# Patient Record
Sex: MALE | Race: WHITE
[De-identification: names, ages, dates, MRNs, and addresses within clinical notes are randomized per-mention and may not be internally consistent; named-entity substitution may affect disease eponyms.]

---

## 2017-12-13 ENCOUNTER — HOSPITAL ENCOUNTER (EMERGENCY)
Dept: HOSPITAL 25 - ED | Age: 32
Discharge: HOME | End: 2017-12-13
Payer: COMMERCIAL

## 2017-12-13 VITALS — SYSTOLIC BLOOD PRESSURE: 124 MMHG | DIASTOLIC BLOOD PRESSURE: 71 MMHG
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
## 2017-12-13 DIAGNOSIS — S16.1XXA: Primary | ICD-10-CM
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
## 2017-12-13 DIAGNOSIS — M54.2: ICD-10-CM

## 2017-12-13 DIAGNOSIS — G89.21: ICD-10-CM

## 2017-12-13 PROCEDURE — 72125 CT NECK SPINE W/O DYE: CPT

## 2017-12-13 PROCEDURE — 99282 EMERGENCY DEPT VISIT SF MDM: CPT

## 2017-12-13 NOTE — RAD
HISTORY: Neck pain, injury



COMPARISONS: None



TECHNIQUE: Multiple contiguous axial CT scans were obtained of the cervical spine without

intravenous contrast, with coronal and sagittal multiplanar reformations.    



FINDINGS:



BRAIN: The visualized brain is unremarkable

CENTRAL CANAL: Evaluation of the central canal is limited on CT technique, however there

is no obvious canalicular mass or epidural hemorrhage.



ALIGNMENT: There is straightening of the normal cervical lordosis.

VERTEBRAL BODIES: The odontoid process is intact. The atlantoaxial intervals are

symmetric. The vertebral bodies are normal in attenuation, without fracture.

JOINTS: There is no subluxation or dislocation

MUSCULATURE: Unremarkable

INTERVERTEBRAL DISCS: The intervertebral disc spaces are relatively preserved in height.



AXIAL IMAGES: On axial images, there is no osseous neural foraminal narrowing or central

canal stenosis.



SOFT TISSUES: The visualized soft tissues of the neck are unremarkable. The prevertebral

fat stripe is preserved.



OTHER: None.



IMPRESSION: 

NO ACUTE OSSEOUS INJURY TO THE CERVICAL SPINE
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
## 2018-02-12 ENCOUNTER — HOSPITAL ENCOUNTER (EMERGENCY)
Dept: HOSPITAL 25 - ED | Age: 33
Discharge: HOME | End: 2018-02-12
Payer: COMMERCIAL

## 2018-02-12 VITALS — SYSTOLIC BLOOD PRESSURE: 131 MMHG | DIASTOLIC BLOOD PRESSURE: 95 MMHG

## 2018-02-12 DIAGNOSIS — N50.812: Primary | ICD-10-CM

## 2018-02-12 PROCEDURE — 81003 URINALYSIS AUTO W/O SCOPE: CPT

## 2018-02-12 PROCEDURE — 76870 US EXAM SCROTUM: CPT

## 2018-02-12 PROCEDURE — 99282 EMERGENCY DEPT VISIT SF MDM: CPT

## 2018-02-12 NOTE — RAD
INDICATION: Left scrotal lump



COMPARISON: None

 

TECHNIQUE: Duplex interrogation of the scrotum was performed.



FINDINGS: The testicles are normal in size and echogenicity. There is no evidence for

testicular mass. The right testis measures 4.5 x 2.8 x 3.0 cm and the left 4.6 x 2.7 x 3.1

cm.



There is symmetric flow on Doppler interrogation. Arterial and venous waveforms are

identified bilaterally.



The right epididymal head measures 1.1 x 0.9  cm and the left 0.9 x 0.5 cm.



Adjacent to the left epididymal head is a hypoechoic and apparently avascular structure

measuring 1.3 x 0.7 x 0.4 cm.



IMPRESSION:  Adjacent to the left epididymal head is an avascular and hypoechoic structure

of indeterminate clinical significance. This could represent a partially thrombosed

varicocele.

## 2018-02-12 NOTE — ED
Al ROSENTHAL Jennifer, scribed for Jalen Mcmillan MD on 02/12/18 at 1957 .





GI/ HPI





- HPI Summary


HPI Summary: 





The patient is a 32 year old male who presents with left testicular pain that 

began about three-four days ago. 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 The next 

day, the pain became a soreness that radiated into his groin. He additionally 

complains of numbness and tingling in his left foot and leg. He took Ibuprofen 

for the pain.





- History of Current Complaint


Chief Complaint: EDUrogenitalProblems


Time Seen by Provider: 02/12/18 19:33


Stated Complaint: PAIN IN LT TESTICLE


Hx Obtained From: Patient


Onset/Duration: Started Days Ago - three-four days, Still Present


Timing: Constant


Severity: Moderate


Current Severity: Moderate


Pain Intensity: 5


Location of Pain: Groin


Additional Locations for Males: Testicles


Pain Characteristics: Other: - Soreness


Associated Signs and Symptoms: Positive: Other: - Numbness and tingling in left 

foot and leg


Aggravating Factor(s): Nothing


Alleviating Factor(s): Nothing





PMH/Surg Hx/FS Hx/Imm Hx


Endocrine/Hematology History: 


   Denies: Hx Diabetes


Cardiovascular History: 


   Denies: Hx Hypertension


Infectious Disease History: No


Infectious Disease History: 


   Denies: Traveled Outside the [place] in Last 30 Days





- Family History


Known Family History: Positive: Hypertension


   Negative: Diabetes





- Social History


Smoking Status (MU): Never Smoked Tobacco





Review of Systems


Positive: other - Left testicular pain


Positive: Numbness - Numbness and tingling of left foot and leg


All Other Systems Reviewed And Are Negative: Yes





Physical Exam





- Summary


Physical Exam Summary: 





Appearance: The patient is well-nourished in no acute distress and in no acute 

pain.


 


Skin: The skin is warm and dry and skin color reflects adequate perfusion.


 


HEENT: ~The head is normocephalic and atraumatic. The pupils are equal and 

reactive. The conjunctivae are clear and without drainage. ~Nares are patent 

and without drainage. ~Mouth reveals moist mucous membranes and the throat is 

without erythema and exudate. ~The external ears are intact. The ear canals are 

patent and without drainage. The tympanic membranes are intact.


 


Neck: the neck is supple with full range of motion and non-tender. There are no 

carotid bruits. ~There is no neck vein distension.


 


Respiratory: Chest is non-tender. ~Lungs are clear to auscultation and breath 

sounds are symmetrical and equal.


 


Cardiovascular: Heart is regular rate and rhythm. ~There is no murmur or rub 

auscultated. ~~There is no peripheral edema and pulses are symmetrical and 

equal.


 


Abdomen: The abdomen is soft and non-tender. ~There are normal bowel sounds 

heard in all four quadrants and there is no organomegaly palpated.





Genitourinary: Tenderness over epididymis.


 


Musculoskeletal: There is no back tenderness noted. ~Extremities are non-tender 

with full range of motion. ~There is good capillary refill. ~There is no 

peripheral edema or calf tenderness elicited.


 


Neurological: Patient is alert and oriented to person, place and time. ~The 

patient has symmetrical motor strength in all four extremities. ~Cranial nerves 

are grossly intact. Deep tendon reflexes are symmetrical and equal in all four 

extremities.


 


Psychiatric: The patient has an appropriate affect and does not exhibit any 

anxiety or depression.


Triage Information Reviewed: Yes


Vital Signs On Initial Exam: 


 Initial Vitals











Temp Pulse Resp BP Pulse Ox


 


 99.1 F   101   16   138/72   99 


 


 02/12/18 16:32  02/12/18 16:32  02/12/18 16:32  02/12/18 16:32  02/12/18 16:32











Vital Signs Reviewed: Yes





Diagnostics





- Vital Signs


 Vital Signs











  Temp Pulse Resp BP Pulse Ox


 


 02/12/18 18:28  100.4 F  102  16  145/70  100


 


 02/12/18 16:32  99.1 F  101  16  138/72  99














- Laboratory


Lab Results: 


 Lab Results











  02/12/18 Range/Units





  17:45 


 


Urine Color  Straw  


 


Urine Appearance  Clear  


 


Urine pH  7.0  (5-9)  


 


Ur Specific Gravity  1.004 L  (1.010-1.030)  


 


Urine Protein  Negative  (Negative)  


 


Urine Ketones  Negative  (Negative)  


 


Urine Blood  Negative  (Negative)  


 


Urine Nitrate  Negative  (Negative)  


 


Urine Bilirubin  Negative  (Negative)  


 


Urine Urobilinogen  Negative  (Negative)  


 


Ur Leukocyte Esterase  Negative  (Negative)  


 


Urine Glucose  Negative  (Negative)  











Lab Statement: Any lab studies that have been ordered have been reviewed, and 

results considered in the medical decision making process.





- Additional Comments


Diagnostic Additional Comments: 





Testicular Ultrasound. Interpreted by a radiologist.


Adjacent to the left epididymal head is an avascular and hypoechoic structure


of indeterminate clinical significance. This could represent a partially 

thrombosed


varicocele. Dr. Mcmillan has reviewed this report.





GIGU Course/Dx





- Course


Course Of Treatment: Mr. Almonte presented with the sudden onset of left 

testicular pain 3 days ago which has improved gradually but is still 

bothersome.  He had a small tender firm area adjacent to the epididymitis but 

the exam was otherwise negative. No hernia was appreciated. U/S showed a 

hypoechoic area near the left epididymitis that was thought to represent a clot 

in a varicocele.  This seems likely to be causing his discomfort although I can'

t account for the transient numbness in his left thigh.  I recommended close F/

U with Urology.





- Diagnoses


Provider Diagnoses: 


 Testicular pain








Discharge





- Discharge Plan


Condition: Stable


Disposition: HOME


Patient Education Materials:  Testicle Pain (ED)


Referrals: 


Chon Dumont MD [Medical Doctor] - 7 Days


Seamus King MD [Medical Doctor] - 7 Days


Additional Instructions: 


Follow up with urology this week if symptoms have not improved.





Return to the emergency department for any new or worsening symptoms. 





The documentation as recorded by the Al quigley Jennifer accurately reflects 

the service I personally performed and the decisions made by me, Jalen Mcmillan MD.

## 2018-03-13 ENCOUNTER — HOSPITAL ENCOUNTER (EMERGENCY)
Dept: HOSPITAL 25 - UCEAST | Age: 33
Discharge: HOME | End: 2018-03-13
Payer: COMMERCIAL

## 2018-03-13 VITALS — DIASTOLIC BLOOD PRESSURE: 87 MMHG | SYSTOLIC BLOOD PRESSURE: 150 MMHG

## 2018-03-13 DIAGNOSIS — M79.1: ICD-10-CM

## 2018-03-13 DIAGNOSIS — M25.519: ICD-10-CM

## 2018-03-13 DIAGNOSIS — Z88.0: ICD-10-CM

## 2018-03-13 DIAGNOSIS — R53.83: Primary | ICD-10-CM

## 2018-03-13 DIAGNOSIS — R07.89: ICD-10-CM

## 2018-03-13 LAB
BASOPHILS # BLD AUTO: 0 10^3/UL (ref 0–0.2)
EOSINOPHIL # BLD AUTO: 0.1 10^3/UL (ref 0–0.6)
HCT VFR BLD AUTO: 43 % (ref 42–52)
HGB BLD-MCNC: 14.7 G/DL (ref 14–18)
LYMPHOCYTES # BLD AUTO: 1.9 10^3/UL (ref 1–4.8)
MCH RBC QN AUTO: 30 PG (ref 27–31)
MCHC RBC AUTO-ENTMCNC: 34 G/DL (ref 31–36)
MCV RBC AUTO: 89 FL (ref 80–94)
MONOCYTES # BLD AUTO: 0.5 10^3/UL (ref 0–0.8)
NEUTROPHILS # BLD AUTO: 3 10^3/UL (ref 1.5–7.7)
NRBC # BLD AUTO: 0 10^3/UL
NRBC BLD QL AUTO: 0
PLATELET # BLD AUTO: 212 10^3/UL (ref 150–450)
RBC # BLD AUTO: 4.84 10^6/UL (ref 4–5.4)
WBC # BLD AUTO: 5.6 10^3/UL (ref 3.5–10.8)

## 2018-03-13 PROCEDURE — 86308 HETEROPHILE ANTIBODY SCREEN: CPT

## 2018-03-13 PROCEDURE — 84443 ASSAY THYROID STIM HORMONE: CPT

## 2018-03-13 PROCEDURE — 36415 COLL VENOUS BLD VENIPUNCTURE: CPT

## 2018-03-13 PROCEDURE — 80053 COMPREHEN METABOLIC PANEL: CPT

## 2018-03-13 PROCEDURE — 86618 LYME DISEASE ANTIBODY: CPT

## 2018-03-13 PROCEDURE — G0463 HOSPITAL OUTPT CLINIC VISIT: HCPCS

## 2018-03-13 PROCEDURE — 86664 EPSTEIN-BARR NUCLEAR ANTIGEN: CPT

## 2018-03-13 PROCEDURE — 99211 OFF/OP EST MAY X REQ PHY/QHP: CPT

## 2018-03-13 PROCEDURE — 85025 COMPLETE CBC W/AUTO DIFF WBC: CPT

## 2018-03-13 PROCEDURE — 86665 EPSTEIN-BARR CAPSID VCA: CPT

## 2018-03-13 NOTE — UC
- Progress Note


Progress Note: 





I called pt, he returned call, spoke with him on the phone at 15:40.  No new 

issues.  Reviewed results as availlable, and recommendation for f/u as planned.

  Aware and agrees with monospot / EBV add-on, results pending.  Questions as 

posed answered to the best of my ability.

## 2018-03-13 NOTE — UC
UC General HPI





- HPI Summary


HPI Summary: 





33 yo gentleman c/o several complaints, concerned that there could be an 

underlying issue.  His wife is due to have a baby this week, and he wants to 

get checked before baby delivers.  Notes pain in ant shoulder / chest wall, 

concerned pain could be lymph nodes.  Possibly attributes to physical pulling 

motion but not sure.  Also notes general feeling of unwell since last week.  No 

specific sx of cough, cold, fever.  But last night had + night sweats, 

prompting his visit here today.  No rash.  + periodic tingling L 4th / 5th 

fingers, not new but has recently experienced recurrence.  Finally, he reports 

last month being seen for testicle issue (in [facility]), better now, but may 

have felt lymph node swelling (not now).  





Has appt as a new patient with PCP, Dr. Guzman on Thurs (today is Tues), 

but he didn't want to wait d/t his wife delivering a baby soon.  





- History of Current Complaint


Chief Complaint: UCGeneralIllness


Stated Complaint: SWELLING UNDER ARMPITS


Time Seen by Provider: 03/13/18 07:52


Hx Obtained From: Patient


Pain Intensity: 1





- Allergy/Home Medications


Allergies/Adverse Reactions: 


 Allergies











Allergy/AdvReac Type Severity Reaction Status Date / Time


 


Penicillins Allergy  Rash Verified 03/13/18 07:46











Home Medications: 


 Home Medications





Ibuprofen 400 mg PO 03/13/18 [History]











PMH/Surg Hx/FS Hx/Imm Hx


Previously Healthy: Yes





- Surgical History


Surgical History: None





- Family History


Known Family History: Positive: Hypertension


   Negative: Diabetes





- Social History


Occupation: Employed Part-time


Lives: With Family


Alcohol Use: None


Substance Use Type: None


Smoking Status (MU): Never Smoked Tobacco





Review of Systems


Constitutional: Fatigue


Skin: Negative


Eyes: Negative


ENT: Negative


Respiratory: Negative


Cardiovascular: Negative


Gastrointestinal: Negative


Genitourinary: Negative


Motor: Other - see hpi


Neurovascular: Other - see hpi


Musculoskeletal: Other: - see hpi


Neurological: Other - see hpi


Psychological: Negative


Is Patient Immunocompromised?: No


All Other Systems Reviewed And Are Negative: Yes





Physical Exam


Triage Information Reviewed: Yes


Appearance: Well-Appearing, Well-Nourished


Vital Signs: 


 Initial Vital Signs











Temp  98.4 F   03/13/18 07:37


 


Pulse  94   03/13/18 07:37


 


Resp  18   03/13/18 07:37


 


BP  150/87   03/13/18 07:37


 


Pulse Ox  99   03/13/18 07:37











Vital Signs Reviewed: Yes


Eye Exam: Normal


ENT: Positive: Other - EAC + cerumen au


Neck exam: Normal


Neck: Positive: Supple, Nontender, No Lymphadenopathy - no lymphadenopathy 

appreciated


Respiratory Exam: Normal


Respiratory: Positive: Chest non-tender, Lungs clear, Normal breath sounds, No 

respiratory distress


Cardiovascular Exam: Normal


Cardiovascular: Positive: RRR, No Murmur, Pulses Normal, Brisk Capillary Refill


Abdominal Exam: Normal


Abdomen Description: Positive: Nontender, Other: - no cvat


Musculoskeletal Exam: Normal - moves x 4 ext's, gait steady


Musculoskeletal: Positive: Other: - axilla bilat - no appreciable 

lymphadenopathy, groin bilat without lymphadenopathy appreciated. no epicondyla 

lymphadenopathy appreciated.  D/w pt that while not appreciated today, 

lymphademopathy could be present, but not detectable today.


Neurological Exam: Normal - grossly nonfocal


Psychological Exam: Normal - conversing easily and appropriately


Skin Exam: Normal - no visible or reported rash





Course/Dx





- Course


Course Of Treatment: Reviewed COA / tx plan with Mr. Calixto.  He would like 

blood work today.  He has several concerns.  Plans to f/u with pcp, hopefully 

as scheduled in 2 days.  CBC.  CMP.  TSH.  Lyme ordered.  Questions as posed 

answered to the best of my ability.





- Differential Dx - Multi-Symptom


Provider Diagnoses: fatigue, myalgia.  possible lymphadenopathy





Discharge





- Discharge Plan


Condition: Stable


Disposition: HOME


Patient Education Materials:  Arthralgia (ED), Fatigue (ED)


Referrals: 


[facility] PHYSICIAN REFERRAL [Outside]


Vinny Ellsworth MD [Medical Doctor] - 


Additional Instructions: 


Your blood pressure was elevated during today's visit, 03/13/2018.   150/87.  

Please follow up with your primary care provider in 1-2 weeks.





Follow up with your primary care physician, Dr. Ellsworth, as scheduled on 

Thursday.  





Please seek medical attention for worse or new problem.  





Blood tests today.

## 2018-03-14 NOTE — UC
- Progress Note


Progress Note: 





CBC, CMP reviewed


mono neg


no change


Altagracia 3/14/2018